# Patient Record
Sex: FEMALE | Race: OTHER | NOT HISPANIC OR LATINO
[De-identification: names, ages, dates, MRNs, and addresses within clinical notes are randomized per-mention and may not be internally consistent; named-entity substitution may affect disease eponyms.]

---

## 2017-02-02 ENCOUNTER — APPOINTMENT (OUTPATIENT)
Dept: SURGICAL ONCOLOGY | Facility: CLINIC | Age: 62
End: 2017-02-02

## 2017-02-03 PROBLEM — Z00.00 ENCOUNTER FOR PREVENTIVE HEALTH EXAMINATION: Status: ACTIVE | Noted: 2017-02-03

## 2020-11-24 ENCOUNTER — APPOINTMENT (RX ONLY)
Dept: URBAN - METROPOLITAN AREA CLINIC 34 | Facility: CLINIC | Age: 65
Setting detail: DERMATOLOGY
End: 2020-11-24

## 2020-11-24 DIAGNOSIS — L66.8 OTHER CICATRICIAL ALOPECIA: ICD-10-CM

## 2020-11-24 DIAGNOSIS — L66.81 CENTRAL CENTRIFUGAL CICATRICIAL ALOPECIA: ICD-10-CM

## 2020-11-24 PROCEDURE — ? TREATMENT REGIMEN

## 2020-11-24 PROCEDURE — ? MEDICATION COUNSELING

## 2020-11-24 PROCEDURE — ? PRESCRIPTION

## 2020-11-24 PROCEDURE — ? ADDITIONAL NOTES

## 2020-11-24 PROCEDURE — ? COUNSELING

## 2020-11-24 PROCEDURE — 99202 OFFICE O/P NEW SF 15 MIN: CPT

## 2020-11-24 RX ORDER — KETOCONAZOLE 20 MG/ML
SHAMPOO, SUSPENSION TOPICAL
Qty: 1 | Refills: 2 | Status: ERX | COMMUNITY
Start: 2020-11-24

## 2020-11-24 RX ADMIN — KETOCONAZOLE: 20 SHAMPOO, SUSPENSION TOPICAL at 00:00

## 2020-11-24 NOTE — PROCEDURE: MEDICATION COUNSELING
[address]  Phone: [phone]  Fax: [phone]    Terri Haynes MD        June 20, 2019    [facility] [place] [address]      Dear Ronny Chase: The results of your most recent Pap smear are normal. This means that no cancerous or precancerous cells were seen. We recommend that you come back in 1 year for your next routine Pap smear. If you have any questions or concerns, please don't hesitate to call.     Sincerely,        Terri Haynes MD Azithromycin Counseling:  I discussed with the patient the risks of azithromycin including but not limited to GI upset, allergic reaction, drug rash, diarrhea, and yeast infections.

## 2020-11-24 NOTE — PROCEDURE: ADDITIONAL NOTES
Additional Notes: Today you were seen for hair loss, several treatment options are available:\\n- Minoxidil topically\\n- Finasteride/minoxidil (pills)\\n- proscriptix supplements (available in office)\\n \\nDiscussed can add topical steroid medication action to help with inflammation and add in minoxidil. Advised hair regrowth is a long process. Should see immediate improvement with scaling, itching, redness. Everyone responds to treatment differently. Rx prescribed can prevent further hair loss. Discussed hair vitamins can help.   Discussed oral medication and be added in but would need to see how patient responds to topical therapy. Will start with topical minoxidil compound and hair vitamin. Will also recommend medicated shampoo.
Detail Level: Simple
Additional Notes: Patient consent was obtained to proceed with the visit and recommended plan of care after discussion of all risks and benefits, including the risks of COVID-19 exposure.

## 2020-11-24 NOTE — PROCEDURE: TREATMENT REGIMEN
all other ROS negative except as per HPI
Initiate Treatment: Minoxidil compound with finasteride and fluocinonide ointment, Ketoconazole shampoo, proscriptix hair vitamins
Detail Level: Zone

## 2020-11-24 NOTE — PROCEDURE: MEDICATION COUNSELING
Xeljesez Pregnancy And Lactation Text: This medication is Pregnancy Category D and is not considered safe during pregnancy.  The risk during breast feeding is also uncertain.

## 2020-11-24 NOTE — HPI: HAIR LOSS
How Did The Hair Loss Occur?: gradual in onset
How Severe Is Your Hair Loss?: severe
Additional History: Pt would like to discuss treatment options

## 2020-11-24 NOTE — PROCEDURE: MEDICATION COUNSELING
Yes 5-Fu Counseling: 5-Fluorouracil Counseling:  I discussed with the patient the risks of 5-fluorouracil including but not limited to erythema, scaling, itching, weeping, crusting, and pain.

## 2021-03-01 ENCOUNTER — APPOINTMENT (RX ONLY)
Dept: URBAN - METROPOLITAN AREA CLINIC 34 | Facility: CLINIC | Age: 66
Setting detail: DERMATOLOGY
End: 2021-03-01

## 2021-03-01 DIAGNOSIS — L66.81 CENTRAL CENTRIFUGAL CICATRICIAL ALOPECIA: ICD-10-CM | Status: STABLE

## 2021-03-01 DIAGNOSIS — L66.8 OTHER CICATRICIAL ALOPECIA: ICD-10-CM | Status: STABLE

## 2021-03-01 PROCEDURE — ? TREATMENT REGIMEN

## 2021-03-01 PROCEDURE — 99213 OFFICE O/P EST LOW 20 MIN: CPT

## 2021-03-01 PROCEDURE — ? ADDITIONAL NOTES

## 2021-03-01 PROCEDURE — ? PRESCRIPTION

## 2021-03-01 RX ORDER — KETOCONAZOLE 20 MG/ML
SHAMPOO, SUSPENSION TOPICAL
Qty: 1 | Refills: 2 | Status: ERX

## 2021-03-01 NOTE — PROCEDURE: ADDITIONAL NOTES
Additional Notes: Dr. Arline Diggs notes that patient can continue with the topical prescription. Dr. Arline Diggs noted that there are sections towards the front that are beginning to sprout new hairs. Dr. Arline Diggs notes that the area in the center is scarred and may not show improvement.\\n\\Gentry Diggs advises patient to contact office if there are any symptoms such as intense itching or tenderness, this may be a sign of inflammation.
Detail Level: Simple

## 2021-03-01 NOTE — PROCEDURE: TREATMENT REGIMEN
Continue Regimen: Minoxidil compound w/ finasteride and fluocinonide ointment, \\nKetoconazole shampoo\\n, proscriptix hair vitamins
Detail Level: Zone

## 2021-09-21 ENCOUNTER — APPOINTMENT (RX ONLY)
Dept: URBAN - METROPOLITAN AREA CLINIC 34 | Facility: CLINIC | Age: 66
Setting detail: DERMATOLOGY
End: 2021-09-21

## 2021-09-21 DIAGNOSIS — L66.8 OTHER CICATRICIAL ALOPECIA: ICD-10-CM | Status: STABLE

## 2021-09-21 DIAGNOSIS — L66.81 CENTRAL CENTRIFUGAL CICATRICIAL ALOPECIA: ICD-10-CM | Status: STABLE

## 2021-09-21 PROCEDURE — 99213 OFFICE O/P EST LOW 20 MIN: CPT

## 2021-09-21 PROCEDURE — ? COUNSELING

## 2021-09-21 PROCEDURE — ? TREATMENT REGIMEN

## 2021-09-21 PROCEDURE — ? ADDITIONAL NOTES

## 2021-09-21 PROCEDURE — ? PRESCRIPTION

## 2021-09-21 RX ORDER — KETOCONAZOLE 20 MG/ML
SHAMPOO, SUSPENSION TOPICAL
Qty: 120 | Refills: 6 | Status: ERX | COMMUNITY
Start: 2021-09-21

## 2021-09-21 RX ADMIN — KETOCONAZOLE: 20 SHAMPOO, SUSPENSION TOPICAL at 00:00

## 2021-09-21 ASSESSMENT — LOCATION DETAILED DESCRIPTION DERM
LOCATION DETAILED: LEFT CENTRAL PARIETAL SCALP
LOCATION DETAILED: RIGHT CENTRAL PARIETAL SCALP
LOCATION DETAILED: POSTERIOR MID-PARIETAL SCALP
LOCATION DETAILED: SUPERIOR MID FOREHEAD

## 2021-09-21 ASSESSMENT — LOCATION SIMPLE DESCRIPTION DERM
LOCATION SIMPLE: SUPERIOR FOREHEAD
LOCATION SIMPLE: POSTERIOR SCALP
LOCATION SIMPLE: SCALP

## 2021-09-21 ASSESSMENT — LOCATION ZONE DERM
LOCATION ZONE: FACE
LOCATION ZONE: SCALP

## 2021-09-21 NOTE — PROCEDURE: TREATMENT REGIMEN
Continue Regimen: Ketoconazole shampoo
Modify Regimen: Minoxidil compound w/ finasteride and fluocinonide ointment —> minoxidil compound no steroid
Plan: Will restart steroid in compound if patient experiences itching
Detail Level: Zone

## 2021-09-21 NOTE — PROCEDURE: ADDITIONAL NOTES
Additional Notes: Patient denies any itching or tenderness since last appointment in March.
Detail Level: Simple
Render Risk Assessment In Note?: no
